# Patient Record
Sex: FEMALE | NOT HISPANIC OR LATINO | URBAN - METROPOLITAN AREA
[De-identification: names, ages, dates, MRNs, and addresses within clinical notes are randomized per-mention and may not be internally consistent; named-entity substitution may affect disease eponyms.]

---

## 2021-11-12 ENCOUNTER — EMERGENCY (EMERGENCY)
Facility: HOSPITAL | Age: 56
LOS: 1 days | Discharge: HOME | End: 2021-11-12
Attending: EMERGENCY MEDICINE | Admitting: EMERGENCY MEDICINE
Payer: COMMERCIAL

## 2021-11-12 VITALS
TEMPERATURE: 98 F | RESPIRATION RATE: 16 BRPM | HEIGHT: 66 IN | DIASTOLIC BLOOD PRESSURE: 90 MMHG | HEART RATE: 82 BPM | SYSTOLIC BLOOD PRESSURE: 133 MMHG | WEIGHT: 108.03 LBS | OXYGEN SATURATION: 96 %
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
## 2021-11-12 DIAGNOSIS — S01.112A LACERATION WITHOUT FOREIGN BODY OF LEFT EYELID AND PERIOCULAR AREA, INITIAL ENCOUNTER: ICD-10-CM
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
## 2021-11-12 DIAGNOSIS — Z88.0 ALLERGY STATUS TO PENICILLIN: ICD-10-CM

## 2021-11-12 PROCEDURE — G1004: CPT

## 2021-11-12 PROCEDURE — 72125 CT NECK SPINE W/O DYE: CPT | Mod: MG

## 2021-11-12 PROCEDURE — 72125 CT NECK SPINE W/O DYE: CPT | Mod: 26,MG

## 2021-11-12 PROCEDURE — 70450 CT HEAD/BRAIN W/O DYE: CPT | Mod: 26,MG

## 2021-11-12 PROCEDURE — 70450 CT HEAD/BRAIN W/O DYE: CPT | Mod: MG

## 2021-11-12 PROCEDURE — 99284 EMERGENCY DEPT VISIT MOD MDM: CPT | Mod: 25

## 2021-11-12 PROCEDURE — 99285 EMERGENCY DEPT VISIT HI MDM: CPT

## 2021-11-12 PROCEDURE — 99282 EMERGENCY DEPT VISIT SF MDM: CPT

## 2021-11-12 RX ORDER — LIDOCAINE HYDROCHLORIDE AND EPINEPHRINE 10; 10 MG/ML; UG/ML
10 INJECTION, SOLUTION INFILTRATION; PERINEURAL ONCE
Refills: 0 | Status: COMPLETED | OUTPATIENT
Start: 2021-11-12 | End: 2021-11-12

## 2021-11-12 RX ADMIN — LIDOCAINE HYDROCHLORIDE AND EPINEPHRINE 10 MILLILITER(S): 10; 10 INJECTION, SOLUTION INFILTRATION; PERINEURAL at 19:31

## 2021-11-12 NOTE — ED ADULT NURSE NOTE - OBJECTIVE STATEMENT
Pt is a 57 y/o female A&Ox4 in NAD ambulatory with steady gait c/o laceration to left eyebrow s/p mechanical fall. Pt denies LOC, blood thinners.

## 2021-11-12 NOTE — ED PROVIDER NOTE - NS ED ROS FT
General: no fever, chills, confusion  Cardiac: no chest pain, chest tightness, palpitations  Lungs: no sob, difficulty breathing  Abdomen: no abdominal pain, nausea, vomiting, diarrhea, constipation, nml BM  : no dysuria, urinary frequency/urgency  Skin: + L eyebrow laceration  Msk: no neck pain, back pain, numbness, tingling    All other systems negative except as per HPI

## 2021-11-12 NOTE — ED PROVIDER NOTE - NSFOLLOWUPINSTRUCTIONS_ED_ALL_ED_FT
Please continue to keep wound clean and dry and follow up with Dr. Bermudez. Return to the Emergency Department if you have any new or worsening symptoms, or if you have any concerns.    Please reach out to Jessica Camarillo ([facility] ED clinical referral coordinator) to assist you with your follow-up appointment.     Monday - Friday 11am-7pm  [phone]  [email or web address]        Facial Laceration      A facial laceration is a cut (laceration) on the face. It is caused by any injury that cuts or tears the skin or tissues on the face. Facial lacerations can bleed and be painful.    You may need medical attention to stop the bleeding, help the wound heal, lower your risk of infection, and prevent scarring. Lacerations usually heal quickly after treatment.      What are the causes?  This condition may be caused by:  •A motor vehicle crash.      •A sports injury.      •A violent attack.      •A fall.        What are the signs or symptoms?  Common symptoms of this condition include:  •An obvious cut on the face.      •Bleeding.      •Pain.      •Swelling.      •Bruising.      •A change in the appearance of the face.        How is this diagnosed?    Your health care provider can diagnose a facial laceration by doing a physical exam and asking how the injury happened. Your health care provider may also check for areas of bleeding, tissue damage, nerve injury, and objects (foreign bodies) in your wound.      How is this treated?  Treatment for a facial laceration depends on how severe and deep the wound is. It also depends on the risk for infection. First, your health care provider will clean the wound to prevent infection. Then, your health care provider will decide whether to close the wound. This depends on how deep the laceration is and how long ago your injury happened. If there is an increased risk of infection, the wound will not be closed.•If your wound needs to be closed:  •Your health care provider will use stitches (sutures), skin glue (skin adhesive), or skin adhesive strips to repair the laceration.      •Your health care provider may numb the area around your wound by injecting a numbing medicine in and around your laceration before doing the sutures.      •Torn skin edges or dead skin may be removed.      •If sutures are used, the laceration may be closed in layers. Absorbable sutures will be used for deep tissues and muscle. Removable sutures will be used to close the skin.      •You may be given:  •Pain medicine.      •A tetanus shot.      •Oral antibiotic medicines.      •Antibiotic ointment.          Follow these instructions at home:    Wound care   Follow instructions from your health care provider about how to take care of your wound. Make sure you:  •Wash your hands with soap and water for at least 20 seconds before and after you change your bandage (dressing). If soap and water are not available, use hand .      •Change your dressing as told by your health care provider.      •Leave sutures, skin adhesive, or adhesive strips in place. These skin closures may need to stay in place for 2 weeks or longer. If adhesive strip edges start to loosen and curl up, you may trim the loose edges. Do not remove adhesive strips completely unless your health care provider tells you to do that.      These instructions will vary depending on how the wound was closed.      For sutures:    •Keep the wound clean and dry.      •If you were given a dressing, change it at least once a day, or as told by your health care provider. Also change the dressing if it gets wet or dirty.      •Wash the wound with soap and water two times a day, or as told by your health care provider. Rinse off the soap with water. Pat the wound dry with a clean towel.      •After cleaning, apply a thin layer of antibiotic ointment as told by your health care provider. This helps prevent infection and keeps the dressing from sticking to the wound.      •You may shower as usual after the first 24 hours. Do not soak the wound until the sutures are removed.      •Return to have your sutures removed as told by your health care provider.      •Do not wear makeup in the area of the wound until your health care provider has approved.        For skin adhesive:    •You may briefly wet your wound in the shower or bath.      •Do not soak or scrub the wound.      •Do not swim.      •Do not sweat heavily until the skin adhesive has fallen off on its own.      •After showering or bathing, gently pat the wound dry with a clean towel.      •Do not apply liquid medicine, cream medicine, ointment, or makeup to your wound while the skin adhesive is in place. This may loosen the film before your wound is healed.      •If you have a dressing over your wound, be careful not to apply tape directly over the skin adhesive. This may pull off the adhesive before the wound is healed.      •Do not spend a long time in the sun or use a tanning lamp while the skin adhesive is in place.      •The skin adhesive will usually remain in place for 5–10 days and then naturally fall off the skin. Do not pick at the adhesive film.        For skin adhesive strips:    •Keep the wound clean and dry.      •Do not let the skin adhesive strips get wet.      •Bathe carefully to keep the wound and adhesive strips dry. If the wound gets wet, pat it dry with a clean towel right away.      •Skin adhesive strips fall off on their own over time. You may trim the strips as the wound heals. Do not remove skin adhesive strips that are still stuck to the wound.      General instructions  •Check your wound area every day for signs of infection. Check for:  •More redness, swelling, or pain.      •Fluid or blood.      •Warmth.      •Pus or a bad smell.        •Take over-the-counter and prescription medicines only as told by your health care provider.      •If you were prescribed an antibiotic medicine, take it or apply it as told by your health care provider. Do not stop using the antibiotic even if you start to feel better.    •After the laceration has healed:  •Know that it can take a year or two for redness or scarring to fade.      •Apply sunscreen to the skin of your healed wound to minimize scarring. Ultraviolet (UV) rays can darken scar tissue.          Contact a health care provider if:    •You have a fever. A fever is a body temperature that is 100.4°F (38°C) or higher.      •You have more redness, swelling, or pain around your wound.      •You have fluid or blood coming from your wound.      •Your wound feels warm to the touch.      •You have pus or a bad smell coming from your wound.        Get help right away if:    •You have a red streak going away from your wound.        Summary    •You may need treatment for a facial laceration to prevent infection, stop bleeding, help healing, and prevent scarring.      •A deep laceration may be closed with stitches (sutures).      •Follow your health care provider's wound care instructions carefully.      This information is not intended to replace advice given to you by your health care provider. Make sure you discuss any questions you have with your health care provider.      Document Revised: 03/17/2021 Document Reviewed: 03/17/2021    Elsevier Patient Education © 2021 [employer]

## 2021-11-12 NOTE — ED ADULT TRIAGE NOTE - CHIEF COMPLAINT QUOTE
pt here for laceration to LT eyebrow after a mechanical fall today, pt denies any LOC, c-spine tenderness, blood thinners or any other injuries, bleeding well controlled

## 2021-11-12 NOTE — ED PROVIDER NOTE - PHYSICAL EXAMINATION
CONSTITUTIONAL: Well-developed; well-nourished; in no acute distress.  SKIN:  Warm and dry, no acute rash. 3cm laceration over the L eyebrow.  HEAD: Normocephalic; atraumatic.  EYES: PERRL, EOM intact; conjunctiva and sclera clear.   ENT: No nasal discharge; airway clear.  NECK: Supple; non tender.  CARD: S1, S2 normal; no murmurs, gallops, or rubs. Regular rate and rhythm.  RESP: No wheezes, rales or rhonchi.  ABD: Normal bowel sounds; soft; non-distended; non-tender; no hepatosplenomegaly.  EXT: Normal ROM. No clubbing, cyanosis or edema.  LYMPH: No acute cervical adenopathy.  NEURO: Alert, oriented. Grossly unremarkable.  PSYCH: Cooperative, appropriate.

## 2021-11-12 NOTE — ED ADULT NURSE NOTE - NSIMPLEMENTINTERV_GEN_ALL_ED
Implemented All Universal Safety Interventions:  Ida to call system. Call bell, personal items and telephone within reach. Instruct patient to call for assistance. Room bathroom lighting operational. Non-slip footwear when patient is off stretcher. Physically safe environment: no spills, clutter or unnecessary equipment. Stretcher in lowest position, wheels locked, appropriate side rails in place.

## 2021-11-12 NOTE — ED PROVIDER NOTE - CARE PROVIDER_API CALL
Kevin Bermudez)  Plastic Surgery; Surgery  [address] [place] [id number]  Phone: [phone]  Fax: [phone]  Follow Up Time:

## 2021-11-12 NOTE — ED PROVIDER NOTE - PATIENT PORTAL LINK FT
You can access the FollowMyHealth Patient Portal offered by [facility] by registering at the following website: [email or web address]. By joining [employer]’s FollowMyHealth portal, you will also be able to view your health information using other applications (apps) compatible with our system.

## 2021-11-12 NOTE — ED PROVIDER NOTE - CLINICAL SUMMARY MEDICAL DECISION MAKING FREE TEXT BOX
55 y/o F presents c/o laceration over the L eyebrow s/p trip and fall earlier today. Pt requesting plastic surgery for laceration repair. Will consult plastics, obtain CT head and c-spine to r/o acute injury and reassess. 55 y/o F presents c/o laceration over the L eyebrow s/p trip and fall earlier today. Pt requesting plastic surgery for laceration repair. Will consult plastics, obtain CT head and c-spine to r/o acute injury and reassess. CT head and CT cervical negative. No sxs of concussion. Repair conducted by Dr. Bermudez. Advised follow-up in one week and given wound care instructions. I have discussed the discharge plan with the patient. The patient agrees with the plan, as discussed.  The patient understands Emergency Department diagnosis is a preliminary diagnosis often based on limited information and that the patient must adhere to the follow-up plan as discussed.  The patient understands that if the symptoms worsen or if prescribed medications do not have the desired/planned effect that the patient may return to the Emergency Department at any time for further evaluation and treatment.

## 2021-11-12 NOTE — ED PROVIDER NOTE - OBJECTIVE STATEMENT
55 y/o F with no PMHx, tetanus up to date, presents to the ED c/o a laceration to her L eyebrow s/p fall today. 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 Denies LOC. Pt was wearing glasses and states her glasses did not break. Pt washed the area with water in the sink and placed an ice cube on the area to lessen the pain. Pt initially went to urgent care but was recommended to come to the ED for further evaluation.  Currently reports a mild headache and throbbing around the laceration in the ED. Denies use of blood thinners or h/o seizures or previous head injuries. Denies neck pain, back pain, or numbness or tingling to the area. 55 y/o F with no PMHx, tetanus up to date, presents to the ED c/o a laceration to her L eyebrow s/p fall today. 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 Denies LOC. Pt was wearing glasses and states her glasses did not break. Pt washed the area with water in the sink and placed an ice cube on the area to lessen the pain. Pt initially went to urgent care but was recommended to come to the ED for further evaluation.  Currently reports a mild headache and throbbing around the laceration in the ED. Denies use of blood thinners, h/o seizures, or previous head injuries. Denies neck pain, back pain, or numbness or tingling to the area.

## 2024-08-02 NOTE — ED PROVIDER NOTE - ATTESTATION, MLM
Detail Level: Generalized Detail Level: Simple Detail Level: Detailed Skin Checks Recommendations: Schedule FBSE Nicotinamide Supplementation Recommendations: 1000 mg twice daily with food Detail Level: Zone Sunscreen Recommendations: spf 50 or above I have reviewed and confirmed nurses' notes for patient's medications, allergies, medical history, and surgical history.

## 2024-11-19 NOTE — ED ADULT TRIAGE NOTE - BP NONINVASIVE SYSTOLIC (MM HG)
"Pt stated to provider, \"I'm hoping to get admitted, I got this lump here on my right shoulder that I need taken care of.\" Provider informed pt that the pt has had this lipoma for awhile now and that alone is not a reason to be admitted to the hospital.     Kenzie Zelaya RN  11/18/24 [id number]    " 133